# Patient Record
Sex: FEMALE | Employment: OTHER | URBAN - METROPOLITAN AREA
[De-identification: names, ages, dates, MRNs, and addresses within clinical notes are randomized per-mention and may not be internally consistent; named-entity substitution may affect disease eponyms.]

---

## 2019-06-14 ENCOUNTER — TELEPHONE (OUTPATIENT)
Dept: INFECTIOUS DISEASES | Age: 54
End: 2019-06-14

## 2019-06-14 NOTE — TELEPHONE ENCOUNTER
Johana Santo #[phone] from [facility] called asking if Lyla Ma would follow PICC line to home health care, I gave a verbal stating that we would follow for PICC line. .-Christine Hyde    Trying to get a hold of [employer] to give verbal orders. Aysha Alvarez

## 2019-06-17 ENCOUNTER — TELEPHONE (OUTPATIENT)
Dept: INFECTIOUS DISEASES | Age: 54
End: 2019-06-17

## 2019-06-17 NOTE — TELEPHONE ENCOUNTER
Alexis Bailey MD   0\"   6/17/2019 10:09 AM   Mandie Gallegos, [birth year]  Patient has a PICC line and is going on a cruise, patient will not be able to get labs drawn on 7/1, patient wants to know if it would be alright to get her labs drawn on 7/8 when she gets back home. .. Please advise. Kalin Berry  0\"   Read 6/17/2019 10:22 AM   0\"   6/17/2019 10:23 AM   Ok

## 2019-06-19 PROBLEM — L65.9 HAIR LOSS: Status: ACTIVE | Noted: 2019-02-19

## 2019-06-19 PROBLEM — M21.379 FOOT-DROP: Status: ACTIVE | Noted: 2017-11-01

## 2019-06-19 PROBLEM — G93.32 CHRONIC FATIGUE SYNDROME: Status: ACTIVE | Noted: 2017-11-01

## 2019-06-19 PROBLEM — G89.29 CHRONIC PAIN OF LEFT KNEE: Status: ACTIVE | Noted: 2019-02-14

## 2019-06-19 PROBLEM — L03.032 CELLULITIS AND ABSCESS OF TOE OF LEFT FOOT: Status: ACTIVE | Noted: 2018-04-30

## 2019-06-19 PROBLEM — M14.60 CHARCOT'S ARTHROPATHY: Status: ACTIVE | Noted: 2019-02-14

## 2019-06-19 PROBLEM — M79.672 CHRONIC PAIN IN LEFT FOOT: Status: ACTIVE | Noted: 2018-12-06

## 2019-06-19 PROBLEM — E61.1 IRON DEFICIENCY: Status: ACTIVE | Noted: 2019-02-19

## 2019-06-19 PROBLEM — L02.612 CELLULITIS AND ABSCESS OF TOE OF LEFT FOOT: Status: ACTIVE | Noted: 2018-04-30

## 2019-06-19 PROBLEM — M25.562 CHRONIC PAIN OF LEFT KNEE: Status: ACTIVE | Noted: 2019-02-14

## 2019-06-19 PROBLEM — E11.9 TYPE 2 DIABETES MELLITUS WITHOUT COMPLICATION, WITHOUT LONG-TERM CURRENT USE OF INSULIN (HCC): Status: ACTIVE | Noted: 2017-09-12

## 2019-06-19 PROBLEM — M86.9 OSTEOMYELITIS OF LEFT ANKLE (HCC): Status: ACTIVE | Noted: 2019-04-11

## 2019-06-19 PROBLEM — I10 ESSENTIAL HYPERTENSION: Status: ACTIVE | Noted: 2019-02-19

## 2019-06-19 PROBLEM — G25.81 RESTLESS LEGS SYNDROME (RLS): Status: ACTIVE | Noted: 2017-09-12

## 2019-06-19 PROBLEM — Z12.31 ENCOUNTER FOR SCREENING MAMMOGRAM FOR MALIGNANT NEOPLASM OF BREAST: Status: ACTIVE | Noted: 2019-02-19

## 2019-06-19 PROBLEM — Z79.891 ENCOUNTER FOR LONG-TERM OPIATE ANALGESIC USE: Status: ACTIVE | Noted: 2018-12-06

## 2019-06-19 PROBLEM — G89.29 CHRONIC PAIN IN LEFT FOOT: Status: ACTIVE | Noted: 2018-12-06

## 2019-06-19 PROBLEM — Z45.2 PICC (PERIPHERALLY INSERTED CENTRAL CATHETER) IN PLACE: Status: ACTIVE | Noted: 2019-04-12

## 2019-06-19 PROBLEM — R79.89 ABNORMAL CBC: Status: ACTIVE | Noted: 2019-02-19

## 2019-06-19 PROBLEM — K21.9 GASTROESOPHAGEAL REFLUX DISEASE: Status: ACTIVE | Noted: 2019-02-19

## 2019-06-26 ENCOUNTER — TELEPHONE (OUTPATIENT)
Dept: INFECTIOUS DISEASES | Age: 54
End: 2019-06-26

## 2019-06-26 DIAGNOSIS — R79.89 ABNORMAL CBC: Primary | ICD-10-CM

## 2019-06-26 NOTE — TELEPHONE ENCOUNTER
I received a [place] call [place] [facility] need this order faxed.  I faxed it to #[phone]

## 2019-06-26 NOTE — TELEPHONE ENCOUNTER
Please call you patient Madeline Goel [phone] at   [phone]. Her labs from yesterday came back abnormal and she is concerned due to leaving on a lauri tomorrow. Thanks, Laureen  Read 6/26/2019 2:28 PM   6/26/2019 2:35 PM   I ordered rrpeat wbc    6/26/2019 2:44 PM   Thanks!   Unread

## 2019-06-26 NOTE — TELEPHONE ENCOUNTER
I spoke to pt order repeat wbc b/c of high wbc.  I need a call today with result  I told pt if she doesn't feel good then cancel cruise trip

## 2019-06-27 ENCOUNTER — TELEPHONE (OUTPATIENT)
Dept: INFECTIOUS DISEASES | Age: 54
End: 2019-06-27

## 2019-06-27 NOTE — TELEPHONE ENCOUNTER
Please call Rosa Maria Escamilla : [phone]  Reg: WBC had done today. [id number] was suppose call you with results? ? She is getting on a plane you can leave a message. Thanks, Laureen  Read 2019 1:17 PM     2019 1:18 PM   I dont know result. Call me with wbc results    2019 1:27 PM   I talked to het    2019 1:28 PM   I advised to cancel cruise as she had a fever    2019 2:28 PM   Thank you the results are not in her chart yet.   Unread

## 2019-06-28 ENCOUNTER — TELEPHONE (OUTPATIENT)
Dept: INFECTIOUS DISEASES | Age: 54
End: 2019-06-28

## 2019-06-28 DIAGNOSIS — R79.89 ABNORMAL CBC: Primary | ICD-10-CM

## 2019-06-28 NOTE — TELEPHONE ENCOUNTER
Tania Foster gave me a message that patient called back. I called patient back and left a message on machine asking for a call back. Our phone number was provided.

## 2019-06-28 NOTE — TELEPHONE ENCOUNTER
Patient called, I faxed her blood culture orders to [facility] in [place] #[phone]

## 2019-07-10 DIAGNOSIS — R79.89 ABNORMAL CBC: ICD-10-CM

## 2019-07-11 ENCOUNTER — TELEPHONE (OUTPATIENT)
Dept: INFECTIOUS DISEASES | Age: 54
End: 2019-07-11

## 2019-07-11 NOTE — TELEPHONE ENCOUNTER
[facility] called asking when the patients next appointment is. .-Karen Dee

## 2019-07-18 ENCOUNTER — TELEPHONE (OUTPATIENT)
Dept: INFECTIOUS DISEASES | Age: 54
End: 2019-07-18

## 2019-07-18 NOTE — TELEPHONE ENCOUNTER
Alan Chau MD 7/18/2019 11:48 AM Good Morning Dr. Radha Boogie from [place] is calling stating that your patient Nile Kendall Female, 52 yrs, 1965 IV meds are completed today and patient has a follow up with you on 8/1/19. do you want this patient Picc line to be removed or maintained until seen. If you would like it pulled I will need an order placed because the patient needs to go to an infusion center to have it pulled. Please advise Violetta Essex Read 7/18/2019 11:49 AM 7/18/2019 11:53 AM Maintain line 7/18/2019 11:56 AM thank you! Read 7/18/2019 11:57 AM       CALLED PT AND INFORMED HER TO MAINTAIN LINE UNTIL SEEN.  [phone].

## 2019-07-19 PROBLEM — Z12.31 ENCOUNTER FOR SCREENING MAMMOGRAM FOR MALIGNANT NEOPLASM OF BREAST: Status: RESOLVED | Noted: 2019-02-19 | Resolved: 2019-07-19

## 2019-08-02 ENCOUNTER — TELEPHONE (OUTPATIENT)
Dept: INFECTIOUS DISEASES | Age: 54
End: 2019-08-02

## 2019-08-07 LAB
A/G RATIO: NORMAL
ALBUMIN SERPL-MCNC: NORMAL G/DL
ALP BLD-CCNC: NORMAL U/L
ALT SERPL-CCNC: NORMAL U/L
AST SERPL-CCNC: NORMAL U/L
BILIRUB SERPL-MCNC: NORMAL MG/DL (ref 0.1–1.4)
BILIRUBIN DIRECT: NORMAL MG/DL
BILIRUBIN, INDIRECT: NORMAL
GLOBULIN: NORMAL
PROTEIN TOTAL: NORMAL G/DL

## 2019-08-07 NOTE — TELEPHONE ENCOUNTER
Dr. Chaz Aguirre placed the orders I spoke with the patient she is going to [facility] and she asked the orders to be faxed to [phone]. This has been taken care of.

## 2019-08-08 ENCOUNTER — HOSPITAL ENCOUNTER (OUTPATIENT)
Age: 54
Setting detail: SPECIMEN
Discharge: HOME | End: 2019-08-08
Payer: COMMERCIAL

## 2019-08-08 ENCOUNTER — OFFICE VISIT (OUTPATIENT)
Dept: INFECTIOUS DISEASES | Age: 54
End: 2019-08-08
Payer: COMMERCIAL

## 2019-08-08 VITALS
SYSTOLIC BLOOD PRESSURE: 117 MMHG | TEMPERATURE: 98.4 F | DIASTOLIC BLOOD PRESSURE: 67 MMHG | HEART RATE: 60 BPM | WEIGHT: 293 LBS | HEIGHT: 72 IN | BODY MASS INDEX: 39.68 KG/M2 | OXYGEN SATURATION: 100 %

## 2019-08-08 DIAGNOSIS — M86.9 OSTEOMYELITIS, UNSPECIFIED SITE, UNSPECIFIED TYPE (HCC): ICD-10-CM

## 2019-08-08 DIAGNOSIS — M86.9 OSTEOMYELITIS, UNSPECIFIED SITE, UNSPECIFIED TYPE (HCC): Primary | ICD-10-CM

## 2019-08-08 PROCEDURE — 99213 OFFICE O/P EST LOW 20 MIN: CPT | Performed by: INTERNAL MEDICINE

## 2019-08-08 RX ORDER — MULTIVITAMIN WITH IRON
100 TABLET ORAL DAILY
COMMUNITY

## 2019-08-08 RX ORDER — DOXYCYCLINE HYCLATE 50 MG/1
100 CAPSULE ORAL
COMMUNITY
Start: 2019-08-01 | End: 2019-10-02 | Stop reason: ALTCHOICE

## 2019-08-08 RX ORDER — DULOXETIN HYDROCHLORIDE 30 MG/1
30 CAPSULE, DELAYED RELEASE ORAL DAILY
COMMUNITY
Start: 2019-06-24

## 2019-08-08 RX ORDER — ROPINIROLE 1 MG/1
2 TABLET, FILM COATED ORAL NIGHTLY
COMMUNITY
Start: 2019-02-19

## 2019-08-08 RX ORDER — GABAPENTIN 800 MG/1
800 TABLET ORAL 3 TIMES DAILY
COMMUNITY

## 2019-08-08 RX ORDER — FERROUS SULFATE 325(65) MG
325 TABLET ORAL 2 TIMES DAILY
COMMUNITY

## 2019-08-08 RX ORDER — METOPROLOL TARTRATE 50 MG/1
75 TABLET, FILM COATED ORAL 2 TIMES DAILY
COMMUNITY
Start: 2019-04-09

## 2019-08-08 RX ORDER — HYDROCHLOROTHIAZIDE 12.5 MG/1
12.5 CAPSULE, GELATIN COATED ORAL DAILY
COMMUNITY
Start: 2019-05-20

## 2019-08-08 RX ORDER — AMLODIPINE BESYLATE 5 MG/1
5 TABLET ORAL NIGHTLY
COMMUNITY
Start: 2019-04-17

## 2019-08-08 RX ORDER — LEVOTHYROXINE SODIUM 0.1 MG/1
100 TABLET ORAL DAILY
COMMUNITY
Start: 2019-06-13

## 2019-08-08 RX ORDER — ACETAMINOPHEN 500 MG
500 TABLET ORAL EVERY 6 HOURS PRN
COMMUNITY

## 2019-08-08 RX ORDER — LOSARTAN POTASSIUM 100 MG/1
100 TABLET ORAL DAILY
COMMUNITY
Start: 2019-07-08

## 2019-08-08 RX ORDER — TRAMADOL HYDROCHLORIDE 50 MG/1
100 TABLET ORAL
COMMUNITY
End: 2019-10-02 | Stop reason: ALTCHOICE

## 2019-08-08 RX ORDER — OMEPRAZOLE 40 MG/1
40 CAPSULE, DELAYED RELEASE ORAL DAILY
COMMUNITY
Start: 2019-03-07

## 2019-08-08 RX ORDER — CYANOCOBALAMIN (VITAMIN B-12) 500 MCG
800 LOZENGE ORAL EVERY MORNING
COMMUNITY

## 2019-08-09 DIAGNOSIS — M86.9 OSTEOMYELITIS, UNSPECIFIED SITE, UNSPECIFIED TYPE (HCC): ICD-10-CM

## 2019-08-09 NOTE — PROGRESS NOTES
by mouth, Disp: , Rfl:     DULoxetine (CYMBALTA) 30 MG extended release capsule, Take 30 mg by mouth, Disp: , Rfl:     gabapentin (NEURONTIN) 800 MG tablet, Take 800 mg by mouth. Take 600 mg qd, Disp: , Rfl:     Heparin Sodium, Porcine, (HEPARIN LOCK FLUSH IJ), Infuse 5 mLs intravenously, Disp: , Rfl:     hydrochlorothiazide (MICROZIDE) 12.5 MG capsule, TAKE ONE CAPSULE BY MOUTH DAILY, Disp: , Rfl:     ferrous sulfate 325 (65 Fe) MG tablet, Take 325 mg by mouth, Disp: , Rfl:     levothyroxine (SYNTHROID) 100 MCG tablet, TAKE ONE TABLET BY MOUTH DAILY, Disp: , Rfl:     losartan (COZAAR) 100 MG tablet, Take 100 mg by mouth Qd, Disp: , Rfl:     metFORMIN (GLUCOPHAGE) 500 MG tablet, TAKE ONE TABLET BY MOUTH DAILY WITH BREAKFAST, Disp: , Rfl:     metoprolol tartrate (LOPRESSOR) 50 MG tablet, Take 75 mg by mouth BID, Disp: , Rfl:     omeprazole (PRILOSEC) 40 MG delayed release capsule, Take 40 mg by mouth Qd, Disp: , Rfl:     rOPINIRole (REQUIP) 1 MG tablet, Take one or two at bedtime, Disp: , Rfl:     Vitamin E 400 units TABS, 800 units in the AM, Disp: , Rfl:     traMADol (ULTRAM) 50 MG tablet, Take 100 mg by mouth. TID, Disp: , Rfl:     acetaminophen (TYLENOL) 500 MG tablet, Take 500 mg by mouth, Disp: , Rfl:     vitamin D 1000 units CAPS, Take 1,000 Units by mouth, Disp: , Rfl:     vitamin B-6 (PYRIDOXINE) 100 MG tablet, Vitamin B-6 100 MG Oral Tablet TAKE 1 TABLET DAILY AS DIRECTED.   Refills: 0 Active, Disp: , Rfl:      Review of Systems:  CONSTITUTIONAL:  negative  EYES:  negative  HEENT:  negative  RESPIRATORY:  negative  CARDIOVASCULAR:  negative  GASTROINTESTINAL:  negative  GENITOURINARY:  negative  INTEGUMENT/BREAST:  negative  HEMATOLOGIC/LYMPHATIC:  negative  ALLERGIC/IMMUNOLOGIC:  negative  ENDOCRINE:  negative  MUSCULOSKELETAL:  negative  NEUROLOGICAL:  negative  BEHAVIOR/PSYCH:  negative    /67 (Site: Right Upper Arm, Position: Sitting, Cuff Size: Large Adult)   Pulse 60   Temp 98.4

## 2019-08-11 LAB
CULTURE: ABNORMAL
DIRECT EXAM: ABNORMAL
DIRECT EXAM: ABNORMAL
Lab: ABNORMAL
SPECIMEN DESCRIPTION: ABNORMAL

## 2019-08-12 ENCOUNTER — TELEPHONE (OUTPATIENT)
Dept: INFECTIOUS DISEASES | Age: 54
End: 2019-08-12

## 2019-08-13 DIAGNOSIS — M86.9 OSTEOMYELITIS, UNSPECIFIED SITE, UNSPECIFIED TYPE (HCC): ICD-10-CM

## 2019-08-14 RX ORDER — AMOXICILLIN AND CLAVULANATE POTASSIUM 500; 125 MG/1; MG/1
1 TABLET, FILM COATED ORAL 3 TIMES DAILY
Qty: 72 TABLET | Refills: 0 | OUTPATIENT
Start: 2019-08-14 | End: 2019-09-07

## 2019-09-04 ENCOUNTER — HOSPITAL ENCOUNTER (OUTPATIENT)
Age: 54
Setting detail: SPECIMEN
Discharge: HOME | End: 2019-09-04
Payer: COMMERCIAL

## 2019-09-04 ENCOUNTER — OFFICE VISIT (OUTPATIENT)
Dept: INFECTIOUS DISEASES | Age: 54
End: 2019-09-04
Payer: COMMERCIAL

## 2019-09-04 VITALS
DIASTOLIC BLOOD PRESSURE: 72 MMHG | WEIGHT: 293 LBS | OXYGEN SATURATION: 98 % | HEIGHT: 72 IN | HEART RATE: 62 BPM | BODY MASS INDEX: 39.68 KG/M2 | TEMPERATURE: 97.5 F | SYSTOLIC BLOOD PRESSURE: 153 MMHG

## 2019-09-04 DIAGNOSIS — M86.9 OSTEOMYELITIS, UNSPECIFIED SITE, UNSPECIFIED TYPE (HCC): ICD-10-CM

## 2019-09-04 DIAGNOSIS — M86.9 OSTEOMYELITIS, UNSPECIFIED SITE, UNSPECIFIED TYPE (HCC): Primary | ICD-10-CM

## 2019-09-04 LAB
ABSOLUTE EOS #: 0.35 K/UL (ref 0–0.44)
ABSOLUTE IMMATURE GRANULOCYTE: 0.03 K/UL (ref 0–0.3)
ABSOLUTE LYMPH #: 2.51 K/UL (ref 1.1–3.7)
ABSOLUTE MONO #: 0.72 K/UL (ref 0.1–1.2)
ALBUMIN SERPL-MCNC: 4.3 G/DL (ref 3.5–5.2)
ALBUMIN/GLOBULIN RATIO: 1.1 (ref 1–2.5)
ALP BLD-CCNC: 170 U/L (ref 35–104)
ALT SERPL-CCNC: 20 U/L (ref 5–33)
AST SERPL-CCNC: 22 U/L
BASOPHILS # BLD: 1 % (ref 0–2)
BASOPHILS ABSOLUTE: 0.05 K/UL (ref 0–0.2)
BILIRUB SERPL-MCNC: 0.26 MG/DL (ref 0.3–1.2)
BILIRUBIN DIRECT: <0.08 MG/DL
BILIRUBIN, INDIRECT: ABNORMAL MG/DL (ref 0–1)
CREAT SERPL-MCNC: 0.7 MG/DL (ref 0.5–0.9)
DIFFERENTIAL TYPE: ABNORMAL
EOSINOPHILS RELATIVE PERCENT: 5 % (ref 1–4)
GFR AFRICAN AMERICAN: >60 ML/MIN
GFR NON-AFRICAN AMERICAN: >60 ML/MIN
GFR SERPL CREATININE-BSD FRML MDRD: NORMAL ML/MIN/{1.73_M2}
GFR SERPL CREATININE-BSD FRML MDRD: NORMAL ML/MIN/{1.73_M2}
GLOBULIN: ABNORMAL G/DL (ref 1.5–3.8)
IMMATURE GRANULOCYTES: 0 %
LYMPHOCYTES # BLD: 33 % (ref 24–43)
MONOCYTES # BLD: 9 % (ref 3–12)
PLATELET # BLD: 251 K/UL (ref 138–453)
PLATELET ESTIMATE: ABNORMAL
RBC # BLD: ABNORMAL 10*6/UL
SEG NEUTROPHILS: 53 % (ref 36–65)
SEGMENTED NEUTROPHILS ABSOLUTE COUNT: 4.06 K/UL (ref 1.5–8.1)
TOTAL PROTEIN: 8.3 G/DL (ref 6.4–8.3)
WBC # BLD: 7.7 K/UL (ref 3.5–11.3)
WBC # BLD: ABNORMAL 10*3/UL

## 2019-09-04 PROCEDURE — 99213 OFFICE O/P EST LOW 20 MIN: CPT | Performed by: INTERNAL MEDICINE

## 2019-09-04 RX ORDER — GABAPENTIN 600 MG/1
1 TABLET ORAL DAILY
COMMUNITY
End: 2020-07-01 | Stop reason: ALTCHOICE

## 2019-09-04 RX ORDER — HONEY 100 %
2 PASTE (ML) TOPICAL DAILY
COMMUNITY
Start: 2019-08-22

## 2019-09-04 RX ORDER — AMOXICILLIN AND CLAVULANATE POTASSIUM 500; 125 MG/1; MG/1
1 TABLET, FILM COATED ORAL 2 TIMES DAILY
Qty: 60 TABLET | Refills: 1 | Status: SHIPPED | OUTPATIENT
Start: 2019-09-04 | End: 2019-10-04

## 2019-09-04 NOTE — PROGRESS NOTES
LOCK FLUSH IJ), Infuse 5 mLs intravenously, Disp: , Rfl:     traMADol (ULTRAM) 50 MG tablet, Take 100 mg by mouth. TID, Disp: , Rfl:      Review of Systems:  CONSTITUTIONAL:  negative  EYES:  negative  HEENT:  negative  RESPIRATORY:  negative  CARDIOVASCULAR:  negative  GASTROINTESTINAL:  negative  GENITOURINARY:  negative  INTEGUMENT/BREAST:  negative  HEMATOLOGIC/LYMPHATIC:  negative  ALLERGIC/IMMUNOLOGIC:  negative  ENDOCRINE:  negative  MUSCULOSKELETAL:  negative  NEUROLOGICAL:  negative  BEHAVIOR/PSYCH:  negative    BP (!) 153/72 (Site: Right Lower Arm, Position: Sitting, Cuff Size: Medium Adult) Comment: pt having pain rough last 24 hrs  Pulse 62   Temp 97.5 °F (36.4 °C)   Ht 6' 2\" (1.88 m)   Wt (!) 318 lb 6.4 oz (144.4 kg)   SpO2 98%   BMI 40.88 kg/m²       EXAM:  CONSTITUTIONAL:  awake, alert, cooperative, no apparent distress,  EYES: conjunctiva normal  ENT:  Normocephalic, without obvious abnormality, atraumatic,  LUNGS:  No increased work of breathing, good air exchange, clear to auscultation bilaterally, no crackles or wheezing  CARDIOVASCULAR:  regular rate and rhythm, normal S1 and S2,  no murmur noted  ABDOMEN:   normal bowel sounds, soft, non-distended, non-tender, no masses palpated, no hepatosplenomegally,   MUSCULOSKELETAL: Left lower extremity fixator  NEUROLOGIC:  Awake, alert, oriented to name, place and time  SKIN:  No rash      Data Review:    Reviewed    IMAGING:          Carrie Hurley MD  9/4/2019  11:43 AM      This note was completed using a voice transcription system. Every effort was made to ensure accuracy. However, inadvertent computerized transcription errors may be present.

## 2019-10-02 ENCOUNTER — OFFICE VISIT (OUTPATIENT)
Dept: INFECTIOUS DISEASES | Age: 54
End: 2019-10-02
Payer: COMMERCIAL

## 2019-10-02 VITALS
TEMPERATURE: 97.1 F | HEART RATE: 62 BPM | HEIGHT: 72 IN | DIASTOLIC BLOOD PRESSURE: 74 MMHG | WEIGHT: 293 LBS | SYSTOLIC BLOOD PRESSURE: 136 MMHG | OXYGEN SATURATION: 98 % | BODY MASS INDEX: 39.68 KG/M2

## 2019-10-02 DIAGNOSIS — M86.9 OSTEOMYELITIS, UNSPECIFIED SITE, UNSPECIFIED TYPE (HCC): Primary | ICD-10-CM

## 2019-10-02 PROCEDURE — 99213 OFFICE O/P EST LOW 20 MIN: CPT | Performed by: INTERNAL MEDICINE

## 2019-10-02 RX ORDER — OXYCODONE HYDROCHLORIDE AND ACETAMINOPHEN 5; 325 MG/1; MG/1
1 TABLET ORAL EVERY 6 HOURS PRN
COMMUNITY
Start: 2019-09-05

## 2019-10-02 RX ORDER — ERGOCALCIFEROL (VITAMIN D2) 1250 MCG
50000 CAPSULE ORAL WEEKLY
COMMUNITY
Start: 2019-09-19 | End: 2020-03-04 | Stop reason: ALTCHOICE

## 2019-10-11 LAB
A/G RATIO: NORMAL
ALBUMIN SERPL-MCNC: 4 G/DL
ALP BLD-CCNC: 113 U/L
ALT SERPL-CCNC: 17 U/L
AST SERPL-CCNC: 18 U/L
BILIRUB SERPL-MCNC: 0.3 MG/DL (ref 0.1–1.4)
BILIRUBIN DIRECT: 0 MG/DL
BILIRUBIN, INDIRECT: NORMAL
CREAT SERPL-MCNC: 0.73 MG/DL
GLOBULIN: NORMAL
PROTEIN TOTAL: 7.4 G/DL

## 2019-10-14 DIAGNOSIS — M86.9 OSTEOMYELITIS, UNSPECIFIED SITE, UNSPECIFIED TYPE (HCC): ICD-10-CM

## 2019-10-30 ENCOUNTER — OFFICE VISIT (OUTPATIENT)
Dept: INFECTIOUS DISEASES | Age: 54
End: 2019-10-30
Payer: COMMERCIAL

## 2019-10-30 VITALS
WEIGHT: 293 LBS | BODY MASS INDEX: 39.68 KG/M2 | HEIGHT: 72 IN | SYSTOLIC BLOOD PRESSURE: 134 MMHG | TEMPERATURE: 97.1 F | HEART RATE: 70 BPM | DIASTOLIC BLOOD PRESSURE: 74 MMHG | OXYGEN SATURATION: 98 %

## 2019-10-30 DIAGNOSIS — M86.9 OSTEOMYELITIS, UNSPECIFIED SITE, UNSPECIFIED TYPE (HCC): Primary | ICD-10-CM

## 2019-10-30 PROCEDURE — 99213 OFFICE O/P EST LOW 20 MIN: CPT | Performed by: INTERNAL MEDICINE

## 2019-10-30 RX ORDER — AMOXICILLIN AND CLAVULANATE POTASSIUM 500; 125 MG/1; MG/1
1 TABLET, FILM COATED ORAL 2 TIMES DAILY
Qty: 60 TABLET | Refills: 0 | Status: SHIPPED | OUTPATIENT
Start: 2019-11-15 | End: 2019-12-15

## 2019-10-30 RX ORDER — TIZANIDINE 4 MG/1
4 TABLET ORAL NIGHTLY
COMMUNITY
Start: 2019-10-09

## 2019-11-15 ENCOUNTER — TELEPHONE (OUTPATIENT)
Dept: INFECTIOUS DISEASES | Age: 54
End: 2019-11-15

## 2019-11-15 LAB
A/G RATIO: NORMAL
ALBUMIN SERPL-MCNC: NORMAL G/DL
ALP BLD-CCNC: NORMAL U/L
ALT SERPL-CCNC: NORMAL U/L
AST SERPL-CCNC: NORMAL U/L
BILIRUB SERPL-MCNC: NORMAL MG/DL
BILIRUBIN DIRECT: NORMAL
BILIRUBIN, INDIRECT: NORMAL
CREAT SERPL-MCNC: NORMAL MG/DL
GLOBULIN: NORMAL
PROTEIN TOTAL: NORMAL

## 2019-11-20 DIAGNOSIS — M86.9 OSTEOMYELITIS, UNSPECIFIED SITE, UNSPECIFIED TYPE (HCC): ICD-10-CM

## 2019-12-11 ENCOUNTER — HOSPITAL ENCOUNTER (OUTPATIENT)
Age: 54
Setting detail: SPECIMEN
Discharge: HOME | End: 2019-12-11
Payer: COMMERCIAL

## 2019-12-11 ENCOUNTER — OFFICE VISIT (OUTPATIENT)
Dept: INFECTIOUS DISEASES | Age: 54
End: 2019-12-11
Payer: COMMERCIAL

## 2019-12-11 VITALS
HEART RATE: 52 BPM | HEIGHT: 72 IN | RESPIRATION RATE: 18 BRPM | WEIGHT: 293 LBS | SYSTOLIC BLOOD PRESSURE: 134 MMHG | BODY MASS INDEX: 39.68 KG/M2 | DIASTOLIC BLOOD PRESSURE: 79 MMHG

## 2019-12-11 DIAGNOSIS — M86.9 OSTEOMYELITIS, UNSPECIFIED SITE, UNSPECIFIED TYPE (HCC): Primary | ICD-10-CM

## 2019-12-11 DIAGNOSIS — M86.9 OSTEOMYELITIS, UNSPECIFIED SITE, UNSPECIFIED TYPE (HCC): ICD-10-CM

## 2019-12-11 LAB
ABSOLUTE EOS #: 0.28 K/UL (ref 0–0.44)
ABSOLUTE IMMATURE GRANULOCYTE: <0.03 K/UL (ref 0–0.3)
ABSOLUTE LYMPH #: 2.11 K/UL (ref 1.1–3.7)
ABSOLUTE MONO #: 0.59 K/UL (ref 0.1–1.2)
ALBUMIN SERPL-MCNC: 4 G/DL (ref 3.5–5.2)
ALBUMIN/GLOBULIN RATIO: 0.9 (ref 1–2.5)
ALP BLD-CCNC: 130 U/L (ref 35–104)
ALT SERPL-CCNC: 23 U/L (ref 5–33)
AST SERPL-CCNC: 21 U/L
BASOPHILS # BLD: 1 % (ref 0–2)
BASOPHILS ABSOLUTE: 0.05 K/UL (ref 0–0.2)
BILIRUB SERPL-MCNC: 0.25 MG/DL (ref 0.3–1.2)
BILIRUBIN DIRECT: <0.08 MG/DL
BILIRUBIN, INDIRECT: ABNORMAL MG/DL (ref 0–1)
CHOLESTEROL, FASTING: 219 MG/DL
CHOLESTEROL/HDL RATIO: 6.6
CREAT SERPL-MCNC: 0.76 MG/DL (ref 0.5–0.9)
DIFFERENTIAL TYPE: NORMAL
EOSINOPHILS RELATIVE PERCENT: 4 % (ref 1–4)
GFR AFRICAN AMERICAN: >60 ML/MIN
GFR NON-AFRICAN AMERICAN: >60 ML/MIN
GFR SERPL CREATININE-BSD FRML MDRD: NORMAL ML/MIN/{1.73_M2}
GFR SERPL CREATININE-BSD FRML MDRD: NORMAL ML/MIN/{1.73_M2}
GLOBULIN: ABNORMAL G/DL (ref 1.5–3.8)
HDLC SERPL-MCNC: 33 MG/DL
IMMATURE GRANULOCYTES: 0 %
LDL CHOLESTEROL: 156 MG/DL (ref 0–130)
LYMPHOCYTES # BLD: 29 % (ref 24–43)
MONOCYTES # BLD: 8 % (ref 3–12)
PLATELET # BLD: 343 K/UL (ref 138–453)
PLATELET ESTIMATE: NORMAL
RBC # BLD: NORMAL 10*6/UL
SEG NEUTROPHILS: 58 % (ref 36–65)
SEGMENTED NEUTROPHILS ABSOLUTE COUNT: 4.25 K/UL (ref 1.5–8.1)
THYROXINE, FREE: 1.11 NG/DL (ref 0.93–1.7)
TOTAL PROTEIN: 8.3 G/DL (ref 6.4–8.3)
TRIGLYCERIDE, FASTING: 150 MG/DL
TSH SERPL DL<=0.05 MIU/L-ACNC: 2.99 MIU/L (ref 0.3–5)
VLDLC SERPL CALC-MCNC: ABNORMAL MG/DL (ref 1–30)
WBC # BLD: 7.3 K/UL (ref 3.5–11.3)
WBC # BLD: NORMAL 10*3/UL

## 2019-12-11 PROCEDURE — 99213 OFFICE O/P EST LOW 20 MIN: CPT | Performed by: INTERNAL MEDICINE

## 2019-12-11 RX ORDER — AMOXICILLIN AND CLAVULANATE POTASSIUM 500; 125 MG/1; MG/1
1 TABLET, FILM COATED ORAL 2 TIMES DAILY
Qty: 60 TABLET | Refills: 1 | Status: SHIPPED | OUTPATIENT
Start: 2019-12-11 | End: 2020-01-10

## 2019-12-15 ENCOUNTER — TELEPHONE (OUTPATIENT)
Dept: INFECTIOUS DISEASES | Age: 54
End: 2019-12-15

## 2019-12-15 DIAGNOSIS — M86.9 OSTEOMYELITIS, UNSPECIFIED SITE, UNSPECIFIED TYPE (HCC): Primary | ICD-10-CM

## 2019-12-18 ENCOUNTER — HOSPITAL ENCOUNTER (OUTPATIENT)
Age: 54
Setting detail: SPECIMEN
Discharge: HOME | End: 2019-12-18
Payer: COMMERCIAL

## 2019-12-18 DIAGNOSIS — M86.9 OSTEOMYELITIS, UNSPECIFIED SITE, UNSPECIFIED TYPE (HCC): ICD-10-CM

## 2019-12-18 LAB
ALBUMIN SERPL-MCNC: 4.1 G/DL (ref 3.5–5.2)
ALBUMIN/GLOBULIN RATIO: 1 (ref 1–2.5)
ALP BLD-CCNC: 137 U/L (ref 35–104)
ALT SERPL-CCNC: 23 U/L (ref 5–33)
AST SERPL-CCNC: 20 U/L
BILIRUB SERPL-MCNC: 0.23 MG/DL (ref 0.3–1.2)
BILIRUBIN DIRECT: <0.08 MG/DL
BILIRUBIN, INDIRECT: ABNORMAL MG/DL (ref 0–1)
GLOBULIN: ABNORMAL G/DL (ref 1.5–3.8)
TOTAL PROTEIN: 8.4 G/DL (ref 6.4–8.3)

## 2020-01-13 ENCOUNTER — TELEPHONE (OUTPATIENT)
Dept: INFECTIOUS DISEASES | Age: 55
End: 2020-01-13

## 2020-01-13 NOTE — TELEPHONE ENCOUNTER
Jeri Chaudhary MD 1/13/2020 9:49 AM Ashley Robbins Female, 62 yrs, 1965, ,   MRN:   [id number]  called and stated that she have a discharge with bloody snot color, it is red in inflamed on her foot, she is having a lot of pain with a slight odor.  she stated that she had a low grade fever this weekend please advise Sol Resendez 1/13/2020 10:04 AM 1/13/2020 10:04 AM Send to ER     patient informed

## 2020-02-12 ENCOUNTER — HOSPITAL ENCOUNTER (OUTPATIENT)
Age: 55
Setting detail: SPECIMEN
Discharge: HOME | End: 2020-02-12
Payer: COMMERCIAL

## 2020-02-12 ENCOUNTER — OFFICE VISIT (OUTPATIENT)
Dept: INFECTIOUS DISEASES | Age: 55
End: 2020-02-12
Payer: COMMERCIAL

## 2020-02-12 VITALS
SYSTOLIC BLOOD PRESSURE: 153 MMHG | RESPIRATION RATE: 14 BRPM | TEMPERATURE: 97.3 F | BODY MASS INDEX: 39.68 KG/M2 | HEART RATE: 69 BPM | WEIGHT: 293 LBS | DIASTOLIC BLOOD PRESSURE: 92 MMHG | HEIGHT: 72 IN

## 2020-02-12 LAB
ABSOLUTE EOS #: 0.36 K/UL (ref 0–0.44)
ABSOLUTE IMMATURE GRANULOCYTE: 0.04 K/UL (ref 0–0.3)
ABSOLUTE LYMPH #: 2.41 K/UL (ref 1.1–3.7)
ABSOLUTE MONO #: 0.83 K/UL (ref 0.1–1.2)
ALBUMIN SERPL-MCNC: 4.3 G/DL (ref 3.5–5.2)
ALBUMIN/GLOBULIN RATIO: 1 (ref 1–2.5)
ALP BLD-CCNC: 116 U/L (ref 35–104)
ALT SERPL-CCNC: 26 U/L (ref 5–33)
AST SERPL-CCNC: 25 U/L
BASOPHILS # BLD: 1 % (ref 0–2)
BASOPHILS ABSOLUTE: 0.05 K/UL (ref 0–0.2)
BILIRUB SERPL-MCNC: 0.28 MG/DL (ref 0.3–1.2)
BILIRUBIN DIRECT: <0.08 MG/DL
BILIRUBIN, INDIRECT: ABNORMAL MG/DL (ref 0–1)
CREAT SERPL-MCNC: 0.72 MG/DL (ref 0.5–0.9)
DIFFERENTIAL TYPE: ABNORMAL
EOSINOPHILS RELATIVE PERCENT: 4 % (ref 1–4)
GFR AFRICAN AMERICAN: >60 ML/MIN
GFR NON-AFRICAN AMERICAN: >60 ML/MIN
GFR SERPL CREATININE-BSD FRML MDRD: NORMAL ML/MIN/{1.73_M2}
GFR SERPL CREATININE-BSD FRML MDRD: NORMAL ML/MIN/{1.73_M2}
GLOBULIN: ABNORMAL G/DL (ref 1.5–3.8)
IMMATURE GRANULOCYTES: 1 %
LYMPHOCYTES # BLD: 29 % (ref 24–43)
MONOCYTES # BLD: 10 % (ref 3–12)
PLATELET # BLD: 262 K/UL (ref 138–453)
PLATELET ESTIMATE: ABNORMAL
RBC # BLD: ABNORMAL 10*6/UL
SEG NEUTROPHILS: 55 % (ref 36–65)
SEGMENTED NEUTROPHILS ABSOLUTE COUNT: 4.55 K/UL (ref 1.5–8.1)
TOTAL PROTEIN: 8.5 G/DL (ref 6.4–8.3)
WBC # BLD: 8.2 K/UL (ref 3.5–11.3)
WBC # BLD: ABNORMAL 10*3/UL

## 2020-02-12 PROCEDURE — 99213 OFFICE O/P EST LOW 20 MIN: CPT | Performed by: INTERNAL MEDICINE

## 2020-02-12 RX ORDER — IBUPROFEN 200 MG
600 TABLET ORAL DAILY
COMMUNITY

## 2020-02-12 RX ORDER — EZETIMIBE 10 MG/1
10 TABLET ORAL DAILY
COMMUNITY
Start: 2019-12-12

## 2020-02-12 RX ORDER — DOXYCYCLINE HYCLATE 100 MG
100 TABLET ORAL 2 TIMES DAILY
Qty: 14 TABLET | Refills: 0 | Status: SHIPPED | OUTPATIENT
Start: 2020-02-12 | End: 2020-02-26

## 2020-02-12 RX ORDER — AMOXICILLIN AND CLAVULANATE POTASSIUM 500; 125 MG/1; MG/1
1 TABLET, FILM COATED ORAL 2 TIMES DAILY
COMMUNITY
End: 2020-07-01 | Stop reason: ALTCHOICE

## 2020-02-12 RX ORDER — AMOXICILLIN AND CLAVULANATE POTASSIUM 500; 125 MG/1; MG/1
1 TABLET, FILM COATED ORAL 2 TIMES DAILY
Qty: 60 TABLET | Refills: 2 | Status: SHIPPED
Start: 2020-02-12 | End: 2020-03-04 | Stop reason: SDUPTHER

## 2020-02-12 NOTE — PROGRESS NOTES
[facility]    Follow-up NOTE           Visit date: 2/12/2020      Reason for visit /chief complaints   Pin site infection    Assessment:     Encounter Diagnosis   Name Primary? 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 and osteomyelitis/cellulitis left lower extremity, was planned to be treated till June 10th 2019 based upon the culture data  Previous culture MSSA and Enterobacter along with anaerobic gram-positive cocci January 2019  Status post partial excision of calcaneus/tibia for deep osteomyelitis, left tibia deep implant removal with insertion of antibiotic nail to the left leg 04/30/2019 cultures negative for aerobic organism positive for anaerobic gram-positive cocci  Bilateral Charcot foot  History of left 2nd toe osteomyelitis status post amputation  Diabetes mellitus  Hypertension  Obstructive sleep apnea  Fibromyalgia      Plan:     Recent wound culture was positive for Corynebacterium from the drainage from the left lower extremity pain site   start patient doxycycline for 2 weeks   Will continue Augmentin no refill ordered   if persistent issues need to start IV antibiotic   Supportive care   cultures ordered    check WBC platelets creatinine LFTs next    HPI:     patient is a 49-year-old female came in for follow-up. I am following her for left lower extremity infected left tibial/calcaneal/talo fusion with retained intramedullary nail. She has a external fixator in place.   Recently she was having some issues with the left lower extremity drainage and fever and was advised to go to the ER but she  She can go to the hospital she was seen by her doctor and was put on doxycycline for which she took it for 7 days of fever resolved. She is feeling better but she still having some issues with left lower extremity external fixator pin site. She is also following  Wound care for the right foot wound. No fever or chills now . No cough shortness of breath. No vomiting or diarrhea. Past Medical History:   Diagnosis Date    Abnormal CBC 2/19/2019    Cellulitis and abscess of toe of left foot 4/30/2018    Charcot's arthropathy 2/14/2019    Chronic pain in left foot 12/6/2018    Chronic pain of left knee 2/14/2019    Encounter for screening mammogram for malignant neoplasm of breast 2/19/2019    Foot-drop 11/1/2017    Gastroesophageal reflux disease 2/19/2019    Iron deficiency 2/19/2019    Restless legs syndrome (RLS) 9/12/2017    Type 2 diabetes mellitus without complication, without long-term current use of insulin ([address] 9/12/2017     No past surgical history on file.   Social History     Socioeconomic History    Marital status:      Spouse name: None    Number of children: None    Years of education: None    Highest education level: None   Occupational History    None   Social Needs    Financial resource strain: None    Food insecurity:     Worry: None     Inability: None    Transportation needs:     Medical: None     Non-medical: None   Tobacco Use    Smoking status: Never Smoker    Smokeless tobacco: Never Used   Substance and Sexual Activity    Alcohol use: None    Drug use: None    Sexual activity: None   Lifestyle    Physical activity:     Days per week: None     Minutes per session: None    Stress: None   Relationships    Social connections:     Talks on phone: None     Gets together: None     Attends Religion service: None     Active member of club or organization: None     Attends meetings (GLUCOPHAGE) 500 MG tablet, Take 500 mg by mouth daily (with breakfast) , Disp: , Rfl:     metoprolol tartrate (LOPRESSOR) 50 MG tablet, Take 75 mg by mouth 2 times daily , Disp: , Rfl:     omeprazole (PRILOSEC) 40 MG delayed release capsule, Take 40 mg by mouth daily , Disp: , Rfl:     rOPINIRole (REQUIP) 1 MG tablet, 1-2 tablets at bedtime, Disp: , Rfl:     Vitamin E 400 units TABS, Take 800 Units by mouth every morning , Disp: , Rfl:     acetaminophen (TYLENOL) 500 MG tablet, Take 500 mg by mouth every 6 hours as needed , Disp: , Rfl:     vitamin D 1000 units CAPS, Take 2,000 Units by mouth daily , Disp: , Rfl:     vitamin B-6 (PYRIDOXINE) 100 MG tablet, Take 100 mg by mouth daily , Disp: , Rfl:     sodium chloride 0.9 % SOLN 100 mL with ampicillin-sulbactam 15 (10-5) g SOLR 3 g, Infuse 3 g intravenously 4 times daily, Disp: , Rfl:     ergocalciferol (ERGOCALCIFEROL) 27327 units capsule, Take 50,000 Units by mouth once a week, Disp: , Rfl:     Wound Dressings ([facility] WOUND/BURN DRESSING) PSTE paste, Apply 2 g topically daily, Disp: , Rfl:      Review of Systems:  CONSTITUTIONAL:  negative  EYES:  negative  HEENT:  negative  RESPIRATORY:  negative  CARDIOVASCULAR:  negative  GASTROINTESTINAL:  negative  GENITOURINARY:  negative  INTEGUMENT/BREAST:  negative  HEMATOLOGIC/LYMPHATIC:  negative  ALLERGIC/IMMUNOLOGIC:  negative  ENDOCRINE:  negative  MUSCULOSKELETAL:  negative  NEUROLOGICAL:  negative  BEHAVIOR/PSYCH:  negative    BP (!) 153/92 (Site: Right Lower Arm, Position: Sitting, Cuff Size: Large Adult)   Pulse 69   Temp 97.3 °F (36.3 °C) (Oral)   Resp 14   Ht 6' 2\" (1.88 m)   Wt (!) 329 lb (149.2 kg)   BMI 42.24 kg/m²       EXAM:  CONSTITUTIONAL:  awake, alert, cooperative, no apparent distress,  EYES: conjunctiva normal  ENT:  Normocephalic, without obvious abnormality, atraumatic,  LUNGS:  No increased work of breathing, good air exchange, clear to auscultation bilaterally, no crackles or

## 2020-02-19 LAB
CULTURE: NORMAL
CULTURE: NORMAL
Lab: NORMAL
Lab: NORMAL
SPECIMEN DESCRIPTION: NORMAL
SPECIMEN DESCRIPTION: NORMAL

## 2020-03-04 ENCOUNTER — OFFICE VISIT (OUTPATIENT)
Dept: INFECTIOUS DISEASES | Age: 55
End: 2020-03-04
Payer: COMMERCIAL

## 2020-03-04 VITALS
SYSTOLIC BLOOD PRESSURE: 126 MMHG | DIASTOLIC BLOOD PRESSURE: 61 MMHG | HEIGHT: 72 IN | WEIGHT: 293 LBS | TEMPERATURE: 97.8 F | HEART RATE: 63 BPM | BODY MASS INDEX: 39.68 KG/M2 | OXYGEN SATURATION: 99 %

## 2020-03-04 PROCEDURE — 99213 OFFICE O/P EST LOW 20 MIN: CPT | Performed by: INTERNAL MEDICINE

## 2020-03-04 RX ORDER — DOXYCYCLINE HYCLATE 100 MG
100 TABLET ORAL 2 TIMES DAILY
Qty: 60 TABLET | Refills: 0 | Status: SHIPPED | OUTPATIENT
Start: 2020-03-04 | End: 2020-04-03

## 2020-03-04 NOTE — PROGRESS NOTES
units TABS, Take 800 Units by mouth every morning , Disp: , Rfl:     acetaminophen (TYLENOL) 500 MG tablet, Take 500 mg by mouth every 6 hours as needed , Disp: , Rfl:     vitamin D 1000 units CAPS, Take 2,000 Units by mouth daily , Disp: , Rfl:     vitamin B-6 (PYRIDOXINE) 100 MG tablet, Take 100 mg by mouth daily , Disp: , Rfl:      Review of Systems:  CONSTITUTIONAL:  negative  EYES:  negative  HEENT:  negative  RESPIRATORY:  negative  CARDIOVASCULAR:  negative  GASTROINTESTINAL:  negative  GENITOURINARY:  negative  INTEGUMENT/BREAST:  negative  HEMATOLOGIC/LYMPHATIC:  negative  ALLERGIC/IMMUNOLOGIC:  negative  ENDOCRINE:  negative  MUSCULOSKELETAL:  negative  NEUROLOGICAL:  negative  BEHAVIOR/PSYCH:  negative    /61 (Site: Right Lower Arm, Position: Sitting, Cuff Size: Large Adult)   Pulse 63   Temp 97.8 °F (36.6 °C) (Oral)   Ht 6' 2\" (1.88 m)   Wt (!) 325 lb (147.4 kg)   SpO2 99%   BMI 41.73 kg/m²       EXAM:  CONSTITUTIONAL:  awake, alert, cooperative, no apparent distress,  EYES: conjunctiva normal  ENT:  Normocephalic, without obvious abnormality, atraumatic,  LUNGS:  No increased work of breathing, good air exchange, clear to auscultation bilaterally, no crackles or wheezing  CARDIOVASCULAR:  regular rate and rhythm, normal S1 and S2,  no murmur noted  ABDOMEN:   normal bowel sounds, soft, non-distended, non-tender, no masses palpated, no hepatosplenomegally,   MUSCULOSKELETAL: Lower extremity fixator, drainage from the pin site  NEUROLOGIC:  Awake, alert, oriented to name, place and time  SKIN:  No rash      Data Review:    CBC with Diff  Results in Past 30 Days  Result Component Current Result Ref Range Previous Result Ref Range   Absolute Eos # 0.36 (2/12/2020) 0.00 - 0.44 k/uL Not in Time Range    Absolute Lymph # 2.41 (2/12/2020) 1.10 - 3.70 k/uL Not in Time Range    Absolute Mono # 0.83 (2/12/2020) 0.10 - 1.20 k/uL Not in Time Range    Basophils Absolute 0.05 (2/12/2020) 0.00 - 0.20

## 2020-04-01 ENCOUNTER — OFFICE VISIT (OUTPATIENT)
Dept: INFECTIOUS DISEASES | Age: 55
End: 2020-04-01
Payer: COMMERCIAL

## 2020-04-01 VITALS
SYSTOLIC BLOOD PRESSURE: 139 MMHG | HEART RATE: 60 BPM | WEIGHT: 293 LBS | BODY MASS INDEX: 39.68 KG/M2 | HEIGHT: 72 IN | TEMPERATURE: 97.9 F | DIASTOLIC BLOOD PRESSURE: 72 MMHG

## 2020-04-01 PROCEDURE — 99213 OFFICE O/P EST LOW 20 MIN: CPT | Performed by: INTERNAL MEDICINE

## 2020-04-01 RX ORDER — SULFAMETHOXAZOLE AND TRIMETHOPRIM 800; 160 MG/1; MG/1
1 TABLET ORAL 2 TIMES DAILY
COMMUNITY

## 2020-05-13 PROBLEM — E11.9 TYPE 2 DIABETES MELLITUS WITHOUT COMPLICATION (HCC): Status: ACTIVE | Noted: 2017-09-12

## 2020-05-13 PROBLEM — H52.13 MYOPIA, BILATERAL: Status: ACTIVE | Noted: 2020-03-30

## 2020-05-13 PROBLEM — L03.039 CELLULITIS AND ABSCESS OF TOE: Status: ACTIVE | Noted: 2018-04-30

## 2020-05-13 PROBLEM — I10 HYPERTENSION: Status: ACTIVE | Noted: 2020-05-13

## 2020-05-13 PROBLEM — R79.89 ABNORMAL COMPLETE BLOOD COUNT: Status: ACTIVE | Noted: 2019-02-19

## 2020-05-13 PROBLEM — G25.81 RESTLESS LEGS: Status: ACTIVE | Noted: 2017-09-12

## 2020-05-13 PROBLEM — H25.13 AGE-RELATED NUCLEAR CATARACT, BILATERAL: Status: ACTIVE | Noted: 2020-03-30

## 2020-05-13 PROBLEM — M25.569 KNEE PAIN: Status: ACTIVE | Noted: 2019-02-14

## 2020-05-13 PROBLEM — L65.9 LOSS OF HAIR: Status: ACTIVE | Noted: 2019-02-19

## 2020-05-13 PROBLEM — L02.619 CELLULITIS AND ABSCESS OF TOE: Status: ACTIVE | Noted: 2018-04-30

## 2020-05-13 PROBLEM — Z45.2 PERIPHERALLY INSERTED CENTRAL VENOUS CATHETER IN SITU: Status: ACTIVE | Noted: 2019-04-12

## 2020-05-20 ENCOUNTER — VIRTUAL VISIT (OUTPATIENT)
Dept: INFECTIOUS DISEASES | Age: 55
End: 2020-05-20
Payer: MEDICARE

## 2020-05-20 VITALS — HEIGHT: 72 IN | TEMPERATURE: 98.6 F | BODY MASS INDEX: 39.68 KG/M2 | WEIGHT: 293 LBS

## 2020-05-20 PROCEDURE — G8427 DOCREV CUR MEDS BY ELIG CLIN: HCPCS | Performed by: INTERNAL MEDICINE

## 2020-05-20 PROCEDURE — 99213 OFFICE O/P EST LOW 20 MIN: CPT | Performed by: INTERNAL MEDICINE

## 2020-05-20 PROCEDURE — 3017F COLORECTAL CA SCREEN DOC REV: CPT | Performed by: INTERNAL MEDICINE

## 2020-05-20 RX ORDER — DIAZEPAM 5 MG/1
TABLET ORAL
COMMUNITY
Start: 2020-05-06

## 2020-05-20 NOTE — PROGRESS NOTES
disease 2/19/2019    Hair loss 2/19/2019    Hypertension 5/13/2020    Iron deficiency 2/19/2019    Knee pain 2/14/2019    Loss of hair 2/19/2019    Myopia, bilateral 3/30/2020    Osteomyelitis of left ankle ([facility] 4/11/2019    Peripherally inserted central venous catheter in situ 4/12/2019    PICC (peripherally inserted central catheter) in place 4/12/2019    Restless legs 9/12/2017    Restless legs syndrome (RLS) 9/12/2017    Type 2 diabetes mellitus without complication ([facility] 6/47/8349    Type 2 diabetes mellitus without complication, without long-term current use of insulin ([facility] 9/12/2017     No past surgical history on file.   Social History     Socioeconomic History    Marital status:      Spouse name: Not on file    Number of children: Not on file    Years of education: Not on file    Highest education level: Not on file   Occupational History    Not on file   Social Needs    Financial resource strain: Not on file    Food insecurity     Worry: Not on file     Inability: Not on file    Transportation needs     Medical: Not on file     Non-medical: Not on file   Tobacco Use    Smoking status: Never Smoker    Smokeless tobacco: Never Used   Substance and Sexual Activity    Alcohol use: Not on file    Drug use: Not on file    Sexual activity: Not on file   Lifestyle    Physical activity     Days per week: Not on file     Minutes per session: Not on file    Stress: Not on file   Relationships    Social connections     Talks on phone: Not on file     Gets together: Not on file     Attends Buddhist service: Not on file     Active member of club or organization: Not on file     Attends meetings of clubs or organizations: Not on file     Relationship status: Not on file    Intimate partner violence     Fear of current or ex partner: Not on file     Emotionally abused: Not on file     Physically abused: Not on file     Forced sexual activity: Not on file   Other Topics Concern   

## 2020-07-01 ENCOUNTER — OFFICE VISIT (OUTPATIENT)
Dept: INFECTIOUS DISEASES | Age: 55
End: 2020-07-01
Payer: MEDICARE

## 2020-07-01 VITALS
DIASTOLIC BLOOD PRESSURE: 74 MMHG | HEIGHT: 72 IN | TEMPERATURE: 97.7 F | WEIGHT: 293 LBS | SYSTOLIC BLOOD PRESSURE: 132 MMHG | OXYGEN SATURATION: 98 % | HEART RATE: 59 BPM | BODY MASS INDEX: 39.68 KG/M2

## 2020-07-01 PROCEDURE — 1036F TOBACCO NON-USER: CPT | Performed by: INTERNAL MEDICINE

## 2020-07-01 PROCEDURE — G8417 CALC BMI ABV UP PARAM F/U: HCPCS | Performed by: INTERNAL MEDICINE

## 2020-07-01 PROCEDURE — 99213 OFFICE O/P EST LOW 20 MIN: CPT | Performed by: INTERNAL MEDICINE

## 2020-07-01 PROCEDURE — 3017F COLORECTAL CA SCREEN DOC REV: CPT | Performed by: INTERNAL MEDICINE

## 2020-07-01 PROCEDURE — G8427 DOCREV CUR MEDS BY ELIG CLIN: HCPCS | Performed by: INTERNAL MEDICINE

## 2020-07-01 RX ORDER — CHOLECALCIFEROL (VITAMIN D3) 125 MCG
1 CAPSULE ORAL DAILY
COMMUNITY
Start: 2020-06-25

## 2020-07-01 RX ORDER — HYDROCODONE BITARTRATE AND ACETAMINOPHEN 5; 300 MG/1; MG/1
1 TABLET ORAL 4 TIMES DAILY
COMMUNITY
Start: 2016-08-12

## 2020-07-01 NOTE — PROGRESS NOTES
[facility]    Follow-up NOTE           Visit date: 7/1/2020      Reason for visit /chief complaints   osteo    Assessment:     Encounter Diagnosis   Name Primary? 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 and osteomyelitis/cellulitis left lower extremity, was planned to be treated till June 10th 2019 based upon the culture data  Previous culture MSSA and Enterobacter along with anaerobic gram-positive cocci January 2019  Status post partial excision of calcaneus/tibia for deep osteomyelitis, left tibia deep implant removal with insertion of antibiotic nail to the left leg 04/30/2019 cultures negative for aerobic organism positive for anaerobic gram-positive cocci        Plan:     External fixator has been removed in May and patient has completed antibiotic doxycycline and Augmentin in May    no signs of acute infection at this point  Continue supportive care  Follow-up with me as needed   Continue to follow podiatrist for right foot wound      HPI:    Patient is 33-year-old female came in for follow-up. I am following her for right lower extremity chronic osteo-as well as pin site infection of external fixator. She had a fixator removed already in May and she has completed antibiotic course with doxycycline and Augmentin   No fever chills. No cough or shortness of breath. No vomiting or diarrhea.           Past Medical History:   Diagnosis Date    Abnormal CBC 2/19/2019    Abnormal complete blood count 2/19/2019    Age-related nuclear cataract, bilateral 3/30/2020    Cellulitis and abscess of toe 4/30/2018    Cellulitis and abscess of toe of left foot 4/30/2018    Charcot's arthropathy 2/14/2019    Chronic fatigue syndrome 11/1/2017    Chronic pain in left foot 12/6/2018    Chronic pain of left knee 2/14/2019    Encounter for long-term opiate analgesic use 12/6/2018    Encounter for screening mammogram for malignant neoplasm of breast 2/19/2019    Essential hypertension 2/19/2019    Foot-drop 11/1/2017    Gastroesophageal reflux disease 2/19/2019    Hair loss 2/19/2019    Hypertension 5/13/2020    Iron deficiency 2/19/2019    Knee pain 2/14/2019    Loss of hair 2/19/2019    Myopia, bilateral 3/30/2020    Osteomyelitis of left ankle ([facility] [address] 4/11/2019    Peripherally inserted central venous catheter in situ 4/12/2019    PICC (peripherally inserted central catheter) in place 4/12/2019    Restless legs 9/12/2017    Restless legs syndrome (RLS) 9/12/2017    Type 2 diabetes mellitus without complication ([address] 5/81/2012    Type 2 diabetes mellitus without complication, without long-term current use of insulin ([address] 9/12/2017     No past surgical history on file.   Social History     Socioeconomic History    Marital status:      Spouse name: None    Number of children: None    Years of education: None    Highest education level: None   Occupational History    None   Social Needs    Financial resource strain: None    Food insecurity     Worry: None     Inability: None    Transportation needs     Medical: None     Non-medical: None   Tobacco Use    Smoking status: Never Smoker    Smokeless tobacco: Never Used   Substance and Sexual Activity    Alcohol use: None    Drug use: None    Sexual activity: None   Lifestyle    Physical activity     Days per week: None     Minutes per session: None    Stress: None   Relationships    Social connections     Talks on phone: None     Gets together: None     Attends Muslim service: None     Active member of club or organization: None     Attends meetings of clubs or organizations: None     Relationship status: None    Intimate partner violence     Fear of current or ex partner: None     Emotionally abused: None     Physically abused: None     Forced sexual activity: None   Other Topics Concern    None   Social History Narrative    None     No family history on file. Current med     Current Outpatient Medications:     HYDROcodone-acetaminophen (VICODIN) 5-300 MG TABS, Take 1 tablet by mouth 4 times daily. , Disp: , Rfl:     D3 HIGH POTENCY 50 MCG (2000 UT) CAPS, Take 1 capsule by mouth daily, Disp: , Rfl:     Calcium Citrate 200 MG TABS, Take 2 tablets by mouth 3 times daily, Disp: , Rfl:     diazePAM (VALIUM) 5 MG tablet, , Disp: , Rfl:     sulfamethoxazole-trimethoprim (BACTRIM DS) 800-160 MG per tablet, Take 1 tablet by mouth 2 times daily, Disp: , Rfl:     ezetimibe (ZETIA) 10 MG tablet, Take 10 mg by mouth daily, Disp: , Rfl:     ibuprofen (ADVIL;MOTRIN) 200 MG tablet, Take 600 mg by mouth daily, Disp: , Rfl:     tiZANidine (ZANAFLEX) 4 MG tablet, Take 4 mg by mouth nightly, Disp: , Rfl:     oxyCODONE-acetaminophen (PERCOCET) 5-325 MG per tablet, Take 1 tablet by mouth every 6 hours as needed. , Disp: , Rfl:     Wound Dressings ([facility] WOUND/BURN DRESSING) PSTE paste, Apply 2 g topically daily, Disp: , Rfl:     amLODIPine (NORVASC) 5 MG tablet, Take 5 mg by mouth nightly , Disp: , Rfl:     Multiple Vitamins-Minerals (MULTIVITAMIN ADULT PO), Take 1 tablet by mouth daily , Disp: , Rfl:     DULoxetine (CYMBALTA) 30 MG extended release capsule, Take 30 mg by mouth daily , Disp: , Rfl:     gabapentin (NEURONTIN) 800 MG tablet, Take 800 mg by mouth 3 times daily.  , Disp: , Rfl:     hydrochlorothiazide (MICROZIDE) 12.5 MG capsule, Take 12.5 mg by mouth daily , Disp: , Rfl:     ferrous sulfate 325 (65 Fe) MG tablet, Take 325 mg by mouth 2 times daily , Disp: , Rfl:     levothyroxine (SYNTHROID) 100 MCG tablet, Take 100 mcg by mouth Daily , Disp: , Rfl:     losartan (COZAAR) 100 MG tablet, Take 100 mg by mouth daily , Disp: , Rfl:     metFORMIN (GLUCOPHAGE) 500 MG tablet, Take 500 mg by mouth daily (with breakfast) , Disp: , Rfl:     metoprolol tartrate (LOPRESSOR) 50 MG tablet, Take 75 mg by mouth 2 times daily , Disp: , Rfl:     omeprazole (PRILOSEC) 40 MG delayed release capsule, Take 40 mg by mouth daily , Disp: , Rfl:     rOPINIRole (REQUIP) 1 MG tablet, Take 2 mg by mouth nightly 1-2 tablets at bedtime, Disp: , Rfl:     Vitamin E 400 units TABS, Take 800 Units by mouth every morning , Disp: , Rfl:     acetaminophen (TYLENOL) 500 MG tablet, Take 500 mg by mouth every 6 hours as needed , Disp: , Rfl:     vitamin B-6 (PYRIDOXINE) 100 MG tablet, Take 100 mg by mouth daily , Disp: , Rfl:      Review of Systems:  CONSTITUTIONAL:  negative  EYES:  negative  HEENT:  negative  RESPIRATORY:  negative  CARDIOVASCULAR:  negative  GASTROINTESTINAL:  negative  GENITOURINARY:  negative  INTEGUMENT/BREAST:  negative  HEMATOLOGIC/LYMPHATIC:  negative  ALLERGIC/IMMUNOLOGIC:  negative  ENDOCRINE:  negative  MUSCULOSKELETAL:  negative  NEUROLOGICAL:  negative  BEHAVIOR/PSYCH:  negative    /74 (Site: Left Lower Arm, Position: Sitting, Cuff Size: Large Adult)   Pulse 59   Temp 97.7 °F (36.5 °C) (Temporal)   Ht 6' 2\" (1.88 m)   Wt (!) 350 lb (158.8 kg)   SpO2 98%   BMI 44.94 kg/m²       EXAM:  CONSTITUTIONAL:  awake, alert, cooperative, no apparent distress,  EYES: conjunctiva normal  ENT:  Normocephalic, without obvious abnormality, atraumatic,  LUNGS:  No increased work of breathing, good air exchange, clear to auscultation bilaterally, no crackles or wheezing  CARDIOVASCULAR:  regular rate and rhythm, normal S1 and S2,  no murmur noted  ABDOMEN:   normal bowel sounds, soft, non-distended, non-tender, no masses palpated, no hepatosplenomegally,   MUSCULOSKELETAL:  , left lower extremity site is healing NEUROLOGIC:  Awake, alert, oriented to name, place and time  SKIN:  No rash      Data Review:    Reviewed   IMAGING:          Sherryle Pal, MD  7/1/2020  10:43 AM      This note was completed using a voice transcription system. Every effort was made to ensure accuracy. However, inadvertent computerized transcription errors may be present.

## 2020-11-03 PROBLEM — I10 ESSENTIAL HYPERTENSION: Status: RESOLVED | Noted: 2019-02-19 | Resolved: 2020-11-03

## 2024-07-29 ENCOUNTER — TELEPHONE (OUTPATIENT)
Dept: PREADMISSION TESTING | Age: 59
End: 2024-07-29

## 2024-07-29 RX ORDER — DULOXETIN HYDROCHLORIDE 60 MG/1
60 CAPSULE, DELAYED RELEASE ORAL NIGHTLY
COMMUNITY

## 2024-07-29 RX ORDER — PANTOPRAZOLE SODIUM 40 MG/1
40 TABLET, DELAYED RELEASE ORAL 2 TIMES DAILY
COMMUNITY

## 2024-07-29 RX ORDER — BUPROPION HYDROCHLORIDE 150 MG/1
150 TABLET ORAL EVERY MORNING
COMMUNITY

## 2024-07-29 RX ORDER — MONTELUKAST SODIUM 10 MG/1
10 TABLET ORAL NIGHTLY
COMMUNITY

## 2024-07-29 RX ORDER — TIZANIDINE 4 MG/1
6 TABLET ORAL NIGHTLY
COMMUNITY

## 2024-07-29 RX ORDER — METOPROLOL SUCCINATE 50 MG/1
75 TABLET, EXTENDED RELEASE ORAL 2 TIMES DAILY
COMMUNITY

## 2024-07-29 RX ORDER — TRAZODONE HYDROCHLORIDE 50 MG/1
50 TABLET ORAL NIGHTLY
COMMUNITY

## 2024-07-29 RX ORDER — LOSARTAN POTASSIUM 50 MG/1
50 TABLET ORAL DAILY
COMMUNITY

## 2024-07-29 RX ORDER — DICYCLOMINE HYDROCHLORIDE 10 MG/1
10 CAPSULE ORAL
COMMUNITY

## 2024-07-29 RX ORDER — GABAPENTIN 400 MG/1
400 CAPSULE ORAL EVERY EVENING
COMMUNITY

## 2024-07-29 NOTE — PROGRESS NOTES
Patient instructed on the pre-operative, intra-operative, and post-operative process. Patient instructed on NPO status. Medication instructions and pre operative instruction sheet reviewed with the patient. Instructed pt to stop taking ibuprofen and all OTC vitamins 7 days prior to surgery and to take wellbutrin, gabapentin, synthroid, metoprolol, protonix, and zetia with a small sip of water prior to arriving to the hospital the day of surgery. Patient states she was instructed to stop eliquis as of today per MD office.

## 2024-07-30 ENCOUNTER — ANESTHESIA EVENT (OUTPATIENT)
Dept: OPERATING ROOM | Age: 59
End: 2024-07-30
Payer: COMMERCIAL

## 2024-07-30 NOTE — TELEPHONE ENCOUNTER
Will need more information on the pt most recent note is from 3 years ago.  Could we please get most recent note from PCP. Thanks

## 2024-07-31 ENCOUNTER — ANESTHESIA (OUTPATIENT)
Dept: OPERATING ROOM | Age: 59
End: 2024-07-31
Payer: COMMERCIAL

## 2024-07-31 ENCOUNTER — HOSPITAL ENCOUNTER (OUTPATIENT)
Age: 59
Setting detail: OUTPATIENT SURGERY
Discharge: HOME | End: 2024-07-31
Attending: ORTHOPAEDIC SURGERY | Admitting: ORTHOPAEDIC SURGERY
Payer: COMMERCIAL

## 2024-07-31 VITALS
HEIGHT: 72 IN | SYSTOLIC BLOOD PRESSURE: 151 MMHG | HEART RATE: 78 BPM | BODY MASS INDEX: 39.68 KG/M2 | RESPIRATION RATE: 16 BRPM | OXYGEN SATURATION: 93 % | WEIGHT: 293 LBS | TEMPERATURE: 96.8 F | DIASTOLIC BLOOD PRESSURE: 76 MMHG

## 2024-07-31 PROCEDURE — 6360000002 HC RX W HCPCS: Performed by: NURSE ANESTHETIST, CERTIFIED REGISTERED

## 2024-07-31 PROCEDURE — 3700000001 HC ADD 15 MINUTES (ANESTHESIA): Performed by: ORTHOPAEDIC SURGERY

## 2024-07-31 PROCEDURE — 6360000002 HC RX W HCPCS: Performed by: NURSE PRACTITIONER

## 2024-07-31 PROCEDURE — 7100000000 HC PACU RECOVERY - FIRST 15 MIN: Performed by: ORTHOPAEDIC SURGERY

## 2024-07-31 PROCEDURE — 2500000003 HC RX 250 WO HCPCS

## 2024-07-31 PROCEDURE — 7100000010 HC PHASE II RECOVERY - FIRST 15 MIN: Performed by: ORTHOPAEDIC SURGERY

## 2024-07-31 PROCEDURE — 2580000003 HC RX 258: Performed by: ORTHOPAEDIC SURGERY

## 2024-07-31 PROCEDURE — 2720000010 HC SURG SUPPLY STERILE: Performed by: ORTHOPAEDIC SURGERY

## 2024-07-31 PROCEDURE — 7100000011 HC PHASE II RECOVERY - ADDTL 15 MIN: Performed by: ORTHOPAEDIC SURGERY

## 2024-07-31 PROCEDURE — 3700000000 HC ANESTHESIA ATTENDED CARE: Performed by: ORTHOPAEDIC SURGERY

## 2024-07-31 PROCEDURE — 6360000002 HC RX W HCPCS

## 2024-07-31 PROCEDURE — 3600000014 HC SURGERY LEVEL 4 ADDTL 15MIN: Performed by: ORTHOPAEDIC SURGERY

## 2024-07-31 PROCEDURE — 7100000001 HC PACU RECOVERY - ADDTL 15 MIN: Performed by: ORTHOPAEDIC SURGERY

## 2024-07-31 PROCEDURE — 3600000004 HC SURGERY LEVEL 4 BASE: Performed by: ORTHOPAEDIC SURGERY

## 2024-07-31 PROCEDURE — 2709999900 HC NON-CHARGEABLE SUPPLY: Performed by: ORTHOPAEDIC SURGERY

## 2024-07-31 PROCEDURE — 6370000000 HC RX 637 (ALT 250 FOR IP): Performed by: NURSE ANESTHETIST, CERTIFIED REGISTERED

## 2024-07-31 PROCEDURE — 64447 NJX AA&/STRD FEMORAL NRV IMG: CPT

## 2024-07-31 PROCEDURE — 6370000000 HC RX 637 (ALT 250 FOR IP): Performed by: NURSE PRACTITIONER

## 2024-07-31 PROCEDURE — 2580000003 HC RX 258: Performed by: NURSE ANESTHETIST, CERTIFIED REGISTERED

## 2024-07-31 PROCEDURE — 6360000002 HC RX W HCPCS: Performed by: ORTHOPAEDIC SURGERY

## 2024-07-31 RX ORDER — OXYCODONE HYDROCHLORIDE 5 MG/1
10 TABLET ORAL PRN
Status: DISCONTINUED | OUTPATIENT
Start: 2024-07-31 | End: 2024-07-31 | Stop reason: HOSPADM

## 2024-07-31 RX ORDER — KETOROLAC TROMETHAMINE 30 MG/ML
INJECTION, SOLUTION INTRAMUSCULAR; INTRAVENOUS PRN
Status: DISCONTINUED | OUTPATIENT
Start: 2024-07-31 | End: 2024-07-31 | Stop reason: SDUPTHER

## 2024-07-31 RX ORDER — OXYCODONE HYDROCHLORIDE AND ACETAMINOPHEN 5; 325 MG/1; MG/1
2 TABLET ORAL
Status: COMPLETED | OUTPATIENT
Start: 2024-07-31 | End: 2024-07-31

## 2024-07-31 RX ORDER — DIMENHYDRINATE 50 MG
50 TABLET ORAL
Status: COMPLETED | OUTPATIENT
Start: 2024-07-31 | End: 2024-07-31

## 2024-07-31 RX ORDER — BUPIVACAINE HYDROCHLORIDE 5 MG/ML
INJECTION, SOLUTION EPIDURAL; INTRACAUDAL PRN
Status: DISCONTINUED | OUTPATIENT
Start: 2024-07-31 | End: 2024-07-31 | Stop reason: ALTCHOICE

## 2024-07-31 RX ORDER — DEXAMETHASONE SODIUM PHOSPHATE 10 MG/ML
INJECTION INTRAMUSCULAR; INTRAVENOUS PRN
Status: DISCONTINUED | OUTPATIENT
Start: 2024-07-31 | End: 2024-07-31 | Stop reason: SDUPTHER

## 2024-07-31 RX ORDER — ACETAMINOPHEN 325 MG/1
650 TABLET ORAL ONCE
Status: COMPLETED | OUTPATIENT
Start: 2024-07-31 | End: 2024-07-31

## 2024-07-31 RX ORDER — ROPIVACAINE HYDROCHLORIDE 5 MG/ML
INJECTION, SOLUTION EPIDURAL; INFILTRATION; PERINEURAL PRN
Status: DISCONTINUED | OUTPATIENT
Start: 2024-07-31 | End: 2024-07-31 | Stop reason: SDUPTHER

## 2024-07-31 RX ORDER — PROCHLORPERAZINE EDISYLATE 5 MG/ML
5 INJECTION INTRAMUSCULAR; INTRAVENOUS
Status: DISCONTINUED | OUTPATIENT
Start: 2024-07-31 | End: 2024-07-31 | Stop reason: HOSPADM

## 2024-07-31 RX ORDER — PROPOFOL 10 MG/ML
INJECTION, EMULSION INTRAVENOUS PRN
Status: DISCONTINUED | OUTPATIENT
Start: 2024-07-31 | End: 2024-07-31 | Stop reason: SDUPTHER

## 2024-07-31 RX ORDER — NALOXONE HYDROCHLORIDE 0.4 MG/ML
INJECTION, SOLUTION INTRAMUSCULAR; INTRAVENOUS; SUBCUTANEOUS PRN
Status: DISCONTINUED | OUTPATIENT
Start: 2024-07-31 | End: 2024-07-31 | Stop reason: HOSPADM

## 2024-07-31 RX ORDER — ONDANSETRON 2 MG/ML
4 INJECTION INTRAMUSCULAR; INTRAVENOUS
Status: DISCONTINUED | OUTPATIENT
Start: 2024-07-31 | End: 2024-07-31 | Stop reason: HOSPADM

## 2024-07-31 RX ORDER — SODIUM CHLORIDE, SODIUM LACTATE, POTASSIUM CHLORIDE, CALCIUM CHLORIDE 600; 310; 30; 20 MG/100ML; MG/100ML; MG/100ML; MG/100ML
INJECTION, SOLUTION INTRAVENOUS CONTINUOUS
Status: DISCONTINUED | OUTPATIENT
Start: 2024-07-31 | End: 2024-07-31 | Stop reason: HOSPADM

## 2024-07-31 RX ORDER — FENTANYL CITRATE 50 UG/ML
50 INJECTION, SOLUTION INTRAMUSCULAR; INTRAVENOUS EVERY 5 MIN PRN
Status: COMPLETED | OUTPATIENT
Start: 2024-07-31 | End: 2024-07-31

## 2024-07-31 RX ORDER — ACETAMINOPHEN 325 MG/1
650 TABLET ORAL ONCE
Status: DISCONTINUED | OUTPATIENT
Start: 2024-07-31 | End: 2024-07-31 | Stop reason: HOSPADM

## 2024-07-31 RX ORDER — DEXMEDETOMIDINE HYDROCHLORIDE 100 UG/ML
INJECTION, SOLUTION INTRAVENOUS PRN
Status: DISCONTINUED | OUTPATIENT
Start: 2024-07-31 | End: 2024-07-31 | Stop reason: SDUPTHER

## 2024-07-31 RX ORDER — ONDANSETRON 2 MG/ML
INJECTION INTRAMUSCULAR; INTRAVENOUS PRN
Status: DISCONTINUED | OUTPATIENT
Start: 2024-07-31 | End: 2024-07-31 | Stop reason: SDUPTHER

## 2024-07-31 RX ORDER — LABETALOL HYDROCHLORIDE 5 MG/ML
10 INJECTION, SOLUTION INTRAVENOUS
Status: DISCONTINUED | OUTPATIENT
Start: 2024-07-31 | End: 2024-07-31 | Stop reason: HOSPADM

## 2024-07-31 RX ORDER — OXYCODONE HYDROCHLORIDE 5 MG/1
5 TABLET ORAL PRN
Status: DISCONTINUED | OUTPATIENT
Start: 2024-07-31 | End: 2024-07-31 | Stop reason: HOSPADM

## 2024-07-31 RX ORDER — HYDRALAZINE HYDROCHLORIDE 20 MG/ML
10 INJECTION INTRAMUSCULAR; INTRAVENOUS
Status: DISCONTINUED | OUTPATIENT
Start: 2024-07-31 | End: 2024-07-31 | Stop reason: HOSPADM

## 2024-07-31 RX ORDER — FENTANYL CITRATE 50 UG/ML
25 INJECTION, SOLUTION INTRAMUSCULAR; INTRAVENOUS EVERY 5 MIN PRN
Status: DISCONTINUED | OUTPATIENT
Start: 2024-07-31 | End: 2024-07-31 | Stop reason: HOSPADM

## 2024-07-31 RX ORDER — SODIUM CHLORIDE 9 MG/ML
INJECTION, SOLUTION INTRAVENOUS PRN
Status: DISCONTINUED | OUTPATIENT
Start: 2024-07-31 | End: 2024-07-31 | Stop reason: HOSPADM

## 2024-07-31 RX ORDER — DEXAMETHASONE SODIUM PHOSPHATE 4 MG/ML
INJECTION, SOLUTION INTRA-ARTICULAR; INTRALESIONAL; INTRAMUSCULAR; INTRAVENOUS; SOFT TISSUE PRN
Status: DISCONTINUED | OUTPATIENT
Start: 2024-07-31 | End: 2024-07-31 | Stop reason: SDUPTHER

## 2024-07-31 RX ADMIN — ACETAMINOPHEN 650 MG: 325 TABLET ORAL at 13:13

## 2024-07-31 RX ADMIN — OXYCODONE HYDROCHLORIDE AND ACETAMINOPHEN 2 TABLET: 5; 325 TABLET ORAL at 17:57

## 2024-07-31 RX ADMIN — PROPOFOL 200 MG: 10 INJECTION, EMULSION INTRAVENOUS at 14:31

## 2024-07-31 RX ADMIN — FENTANYL CITRATE 50 MCG: 50 INJECTION, SOLUTION INTRAMUSCULAR; INTRAVENOUS at 16:25

## 2024-07-31 RX ADMIN — Medication 3000 MG: at 14:24

## 2024-07-31 RX ADMIN — DEXAMETHASONE SODIUM PHOSPHATE 4 MG: 4 INJECTION INTRA-ARTICULAR; INTRALESIONAL; INTRAMUSCULAR; INTRAVENOUS; SOFT TISSUE at 14:37

## 2024-07-31 RX ADMIN — DIMENHYDRINATE 50 MG: 50 TABLET ORAL at 13:13

## 2024-07-31 RX ADMIN — ROPIVACAINE HYDROCHLORIDE 15 ML: 5 INJECTION, SOLUTION EPIDURAL; INFILTRATION; PERINEURAL at 16:56

## 2024-07-31 RX ADMIN — ONDANSETRON 4 MG: 2 INJECTION INTRAMUSCULAR; INTRAVENOUS at 15:44

## 2024-07-31 RX ADMIN — PROPOFOL 70 MG: 10 INJECTION, EMULSION INTRAVENOUS at 14:51

## 2024-07-31 RX ADMIN — PROPOFOL 40 MG: 10 INJECTION, EMULSION INTRAVENOUS at 15:41

## 2024-07-31 RX ADMIN — SODIUM CHLORIDE, POTASSIUM CHLORIDE, SODIUM LACTATE AND CALCIUM CHLORIDE: 600; 310; 30; 20 INJECTION, SOLUTION INTRAVENOUS at 13:28

## 2024-07-31 RX ADMIN — DEXMEDETOMIDINE HCL 20 MCG: 100 INJECTION INTRAVENOUS at 14:40

## 2024-07-31 RX ADMIN — FENTANYL CITRATE 50 MCG: 50 INJECTION, SOLUTION INTRAMUSCULAR; INTRAVENOUS at 16:19

## 2024-07-31 RX ADMIN — DEXAMETHASONE SODIUM PHOSPHATE 10 MG: 10 INJECTION INTRAMUSCULAR; INTRAVENOUS at 16:56

## 2024-07-31 RX ADMIN — KETOROLAC TROMETHAMINE 30 MG: 30 INJECTION, SOLUTION INTRAMUSCULAR at 15:44

## 2024-07-31 ASSESSMENT — PAIN DESCRIPTION - DESCRIPTORS
DESCRIPTORS: SHARP
DESCRIPTORS: SORE
DESCRIPTORS: SHARP
DESCRIPTORS: SHARP
DESCRIPTORS: SORE;PRESSURE
DESCRIPTORS: SORE
DESCRIPTORS: SHARP

## 2024-07-31 ASSESSMENT — PAIN - FUNCTIONAL ASSESSMENT
PAIN_FUNCTIONAL_ASSESSMENT: 0-10
PAIN_FUNCTIONAL_ASSESSMENT: FACE, LEGS, ACTIVITY, CRY, AND CONSOLABILITY (FLACC)
PAIN_FUNCTIONAL_ASSESSMENT: 0-10
PAIN_FUNCTIONAL_ASSESSMENT: 0-10
PAIN_FUNCTIONAL_ASSESSMENT: FACE, LEGS, ACTIVITY, CRY, AND CONSOLABILITY (FLACC)
PAIN_FUNCTIONAL_ASSESSMENT: 0-10
PAIN_FUNCTIONAL_ASSESSMENT: FACE, LEGS, ACTIVITY, CRY, AND CONSOLABILITY (FLACC)
PAIN_FUNCTIONAL_ASSESSMENT: 0-10

## 2024-07-31 ASSESSMENT — PAIN DESCRIPTION - ORIENTATION: ORIENTATION: LEFT

## 2024-07-31 ASSESSMENT — PAIN DESCRIPTION - LOCATION: LOCATION: KNEE

## 2024-07-31 ASSESSMENT — PAIN SCALES - GENERAL: PAINLEVEL_OUTOF10: 6

## 2024-07-31 NOTE — ANESTHESIA PROCEDURE NOTES
Peripheral Block    Patient location during procedure: pre-op  Reason for block: post-op pain management  Start time: 7/31/2024 4:45 PM  End time: 7/31/2024 4:58 PM  Staffing  Resident/CRNA: Pura Peralta APRN - CRNA  Other anesthesia staff: Fco Shelley APRN - CRNA  Performed by: Pura Peralta APRN - CRNA  Authorized by: Pura Peralta APRN - CRNA    Preanesthetic Checklist  Completed: patient identified, IV checked, site marked, risks and benefits discussed, surgical/procedural consents, equipment checked, pre-op evaluation, timeout performed, anesthesia consent given, oxygen available, monitors applied/VS acknowledged, fire risk safety assessment completed and verbalized and blood product R/B/A discussed and consented  Peripheral Block   Patient position: supine  Prep: ChloraPrep  Provider prep: mask and sterile gloves  Patient monitoring: continuous pulse ox and IV access  Block type: Saphenous  Laterality: left  Injection technique: single-shot  Guidance: ultrasound guided    Needle   Needle type: Other   Needle gauge: 22 G  Needle localization: ultrasound guidance  Needle length: 10 cmOther needle type: [place]  Assessment   Injection assessment: negative aspiration for heme, no paresthesia on injection, local visualized surrounding nerve on ultrasound, no intravascular symptoms and low pressure verified by pressure monitor  Paresthesia pain: none  Slow fractionated injection: yes  Hemodynamics: stable  Outcomes: uncomplicated and patient tolerated procedure well    Additional Notes  Done in post-op for uncontrolled knee pain.  Administered:  15cc 0.5% ropivcaine PF  10cc NS PF  10mg Decadron PF

## 2024-07-31 NOTE — OP NOTE
DATE OF VISIT: 7/31/2020    PREOPERATIVE DIAGNOSIS:  Left knee MR confirmed meniscal tear,  chondromalacia, internal derangement,Body mass index is 44.55 kg/m².    POSTOPERATIVE DIAGNOSIS:  Left knee MR confirmed meniscal tear,  chondromalacia, internal derangement, Body mass index is 44.55 kg/m².      OPERATION PERFORMED:  Left knee arthroscopy with partial medial and lateral  meniscectomy with patellofemoral chondroplasty and chondral drilling of grade 4 change    SURGEON: Uriel Ford MD    ANESTHESIOLOGIST: BETZAIDA: Pura Peralta, MOHAMUD - BETZAIDA; Petra Medina APRN - BETZAIDA    ASSISTANT: Kristy    ESTIMATED BLOOD LOSS: Less than 10 cc    SPECIMENS: None.       INDICATIONS: The patient is a 58 y.o. female with pain and discomfort of  left knee. Patient has had persistent pain and disablility despite nonoperative management. MRI confirmed meniscal tear, chondromalacia, internal derangement. Given the persistent pain and disability, wished to proceed with surgical treatment. I reviewed the risks, complications, alternatives and benefits. Patient was informed that symptomatic improvement is more common than complete resolution of symptoms, given the underlying chondromalacia change. The patient's family's questions answered, informed consent signed.     DESCRIPTION OF PROCEDURE: A [facility] in the  Operative Suite, time-out was taken. The patient's operative site  verified, administered anesthetic, and preoperative  antibiotics, carefully padded, and positioned on the operative  table.  Left lower extremity was raised, gauze, padding,and  tourniquet placed, secured to the leg tyson. The leg was prepped and draped in  routine sterile fashion.  Total time for the procedure was  less than one hour. We proceeded to make an inferolateral  inferomedial portals used for introduction of arthroscope and  instrumentation. Visualization of the medial compartment revealed a complex tear  of the posterior and  from the chondral PICC site.  Portal sites were closed using nylon suture and the port sites were injected with ropivacaine solution.  A sterile dressing was applied. The patient was then carefully transferred from operating table to [facility] and taken to recovery room in stable condition.    Signed not reviewed in detail    This note was made through a speech recognition program and may contain errors due to phonetic interpretation.

## 2024-07-31 NOTE — PROGRESS NOTES
Pura HUSTON notified of pt's continued elevated pain levels despite pain medication given.  Waiting to hear back about rescue block or to give more pain medication.

## 2024-07-31 NOTE — PROGRESS NOTES
Petra CRNA and Fco CRNA notified of pt's continued fluctuating O2 sats and \"chest tightness.\"  Pt to try incentive spirometry.

## 2024-07-31 NOTE — DISCHARGE INSTRUCTIONS
Patient is permitted weightbearing as tolerated on the left lower extremity.  She is to follow-up with physical therapy on Friday as ordered.  She is to continue exercise fall prevention with use of her wheelchair as well as her walker.    Last dose of Percocet given at 6:00 PM, next dose ok to take at 12:00 AM (midnight).    SAME DAY SURGERY DISCHARGE INSTRUCTIONS    1.  Do not drive or operate hazardous machinery for 24 hours.    2.  Do not make important personal or business decisions for 24 hours.    3.  Do not drink alcoholic beverages for 24 hours.    4.  Do not smoke tobacco products for 24 hours.    5.  Eat light foods (Jell-O, soups, etc....) and drink plenty of fluids (water, Sprite, etc...) up to 8 glasses per day, as you can tolerate.    6.  If your bandages become soaked with bright red blood, place another dressing pad over your bandages.  (DO NOT remove original bandage.)  Call your surgeon for further instructions.  A small amount of bright red blood is to be expected.    7.  Limit your activities for 24 hours.  Do not engage in heavy work until your surgeon gives you permission.      8.  Patient should not be left alone for 12-24 hours following surgical procedure.    9.  Wash hands before and after incision care.  It is important to practice good personal hygiene during the post op period.    10. Report the following signs or any questions regarding your physical condition to your surgeon immediately:    Excessive swelling of, or around the wound area.    Redness.    Temperature of 100 degrees (F) or above.    Excessive pain.    11.  Call your surgeon [phone] for any questions regarding your surgery.      SPECIAL INSTRUCTIONS AND MEDICATIONS    1.  Elevate leg on pillow for comfort & ice to operative site.    2.  Move toes to improve circulation.    3.  Continue leg exercises as instructed by Physical Therapy.    4.  Use prescribed pain pill as directed by the doctor.  You may use aspirin or  Tylenol if you prefer.    5.  Keep your dressing on and dry.    6.  Re-wrap with elastic wrap if knee is swollen.    7.  Use crutches only as needed.

## 2024-07-31 NOTE — PROGRESS NOTES
Discharge Criteria    Inpatients must meet Criteria 1 through 7. All other patients are either YES or N/A. If a NO is chosen then Anesthesia or Surgeon must be notified.      1.  Minimum 30 minutes after last dose of sedative medication.    Yes      2.  Systolic BP between 90 - 160. Diastolic BP between 60 - 90.    Yes      3.  Pulse between 60 - 120    Yes      4.  Respirations between 8 - 25.    Yes      5.  SpO2 92% - 100%.    Yes      6.  Able to cough and swallow or return to baseline function.    Yes      7.  Alert and oriented or return to baseline mental status.    Yes      8.  Demonstrates controlled, coordinated movements, ambulates with steady gait, or return to baseline activity function.    Yes      9.  Minimal or no pain or nausea, or at a level tolerable and acceptable to patient.    Yes      10. Takes and retains oral fluids as allowed.    Yes      11. Procedural / perioperative site stable.  Minimal or no bleeding.    Yes          12. If GI endoscopy procedure, minimal or no abdominal distention or passing flatus.    N/a      13. Written discharge instructions and emergency telephone number provided.    Yes      14. Accompanied by a responsible adult.    Yes

## 2024-07-31 NOTE — PROGRESS NOTES
Petra notified of pt's elevated BP and still high pain rating.  Suggestion to ask pt if she is willing to get up to a chair and eat something so we can give her pain pills.  Pt agreeable.

## 2024-07-31 NOTE — PROGRESS NOTES
Pt asking if she can take some deep breaths and states her chest \"feels tight.\"  O2 placed back on per pt request despite O2 sats 97-98% on RA.

## 2024-07-31 NOTE — PROGRESS NOTES
Fco HUSTON and Pura HUSTON at bedside for peripheral nerve block per pt request.  Fco HUSTON notified of pt's \"tight chest\" feeling.  Will continue to monitor.

## 2024-07-31 NOTE — PROGRESS NOTES
Surgical Care Improvement Project Dose Adjustment    The following antibiotic dose was adjusted by the pharmacist to meet SCIP guidelines.  Dose adjustments made as approved by the Surgical Care Review Committee.    Antibiotic Adjusted: Ancef adjusted to 3 gram for weight > 120 kg  Valeria Ayala Rph., 7/31/2024, 12:25 PM

## 2024-07-31 NOTE — ANESTHESIA PRE PROCEDURE
Department of Anesthesiology  Preprocedure Note       Name:  Mansi Garcia   Age:  58 y.o.  :  1965                                          MRN:  [id number]         Date:  2024      Surgeon: Surgeon(s):  Uriel Ford MD    Procedure: Procedure(s):  KNEE ARTHROSCOPY-WITH MEDIAL OR LATERAL MENISCECTOMY, DEBRIDEMENT/CHONDROPLASTY    Medications prior to admission:   Prior to Admission medications    Medication Sig Start Date End Date Taking? Authorizing Provider   metoprolol succinate (TOPROL XL) 50 MG extended release tablet Take 1.5 tablets by mouth in the morning and 1.5 tablets in the evening.   Yes ProviderSreekanth MD   pantoprazole (PROTONIX) 40 MG tablet Take 1 tablet by mouth in the morning and 1 tablet in the evening.   Yes Sreekanth Chen MD   losartan (COZAAR) 50 MG tablet Take 1 tablet by mouth daily   Yes Sreekanth Chen MD   buPROPion (WELLBUTRIN XL) 150 MG extended release tablet Take 1 tablet by mouth every morning   Yes Sreekanth Chen MD   dicyclomine (BENTYL) 10 MG capsule Take 1 capsule by mouth 4 times daily (before meals and nightly)   Yes ProviderSreekanth MD   DULoxetine (CYMBALTA) 60 MG extended release capsule Take 1 capsule by mouth nightly   Yes ProviderSreekanth MD   apixaban (ELIQUIS) 5 MG TABS tablet Take 1 tablet by mouth 2 times daily   Yes ProviderSreekanth MD   gabapentin (NEURONTIN) 400 MG capsule Take 1 capsule by mouth every evening.   Yes Sreekanth Chen MD   empagliflozin (JARDIANCE) 10 MG tablet Take 1 tablet by mouth daily   Yes ProviderSreekanth MD   montelukast (SINGULAIR) 10 MG tablet Take 1 tablet by mouth nightly   Yes Sreekanth Chen MD   tiZANidine (ZANAFLEX) 4 MG tablet Take 1.5 tablets by mouth nightly   Yes Sreekanth Chen MD   traZODone (DESYREL) 50 MG tablet Take 1 tablet by mouth nightly   Yes ProviderSreekanth MD   D3 HIGH POTENCY 50 MCG (2000 UT) CAPS Take 1 capsule by mouth daily

## 2024-08-01 NOTE — ANESTHESIA POSTPROCEDURE EVALUATION
Department of Anesthesiology  Postprocedure Note    Patient: Mansi Garcia  MRN: [id number]  YOB: 1965  Date of evaluation: 7/31/2024    Procedure Summary       Date: 07/31/24 Room / Location: [address]    Anesthesia Start: 1427 Anesthesia Stop: 1605    Procedure: KNEE ARTHROSCOPY-WITH MEDIAL AND LATERAL MENISCECTOMY, DEBRIDEMENT/CHONDROPLASTY (Left) Diagnosis:       Plica syndrome of left knee      (Plica syndrome of left knee [M67.52])    Surgeons: Uriel Ford MD Responsible Provider: Pura Peralta APRN - CRNA    Anesthesia Type: General ASA Status: 3            Anesthesia Type: General    Herb Phase I: Herb Score: 8    Herb Phase II: Herb Score: 10    Anesthesia Post Evaluation    Patient location during evaluation: bedside  Patient participation: complete - patient participated  Level of consciousness: awake and alert  Airway patency: patent  Nausea & Vomiting: no nausea and no vomiting  Cardiovascular status: hemodynamically stable  Respiratory status: acceptable  Hydration status: stable  Pain management: adequate    No notable events documented.

## (undated) DEVICE — PAD,ABDOMINAL,8"X10",ST,LF: Brand: MEDLINE

## (undated) DEVICE — SOLUTION IRRIG 3000ML 0.9% SOD CHL USP UROMATIC PLAS CONT

## (undated) DEVICE — PADDING CAST W6INXL4YD COT LO LINTING WYTEX

## (undated) DEVICE — SOLUTION IRRIG 1000ML 0.9% SOD CHL USP POUR PLAS BTL

## (undated) DEVICE — HYPODERMIC SAFETY NEEDLE: Brand: MAGELLAN

## (undated) DEVICE — BANDAGE COMPR M W6INXL10YD WHT BGE VELC E MTRX HK AND LOOP

## (undated) DEVICE — 4-PORT MANIFOLD: Brand: NEPTUNE 2

## (undated) DEVICE — SUTURE ETHILON SZ 3-0 L18IN NONABSORBABLE BLK L24MM PS-1 3/8 1663G

## (undated) DEVICE — [TOMCAT CUTTER, ARTHROSCOPIC SHAVER BLADE,  DO NOT RESTERILIZE,  DO NOT USE IF PACKAGE IS DAMAGED,  KEEP DRY,  KEEP AWAY FROM SUNLIGHT]: Brand: FORMULA

## (undated) DEVICE — SPONGE LAP W18XL18IN WHT COT 4 PLY FLD STRUNG RADPQ DISP ST 2 PER PACK

## (undated) DEVICE — NEEDLE SPNL L3.5IN PNK HUB S STL REG WALL FIT STYL W/ QNCKE

## (undated) DEVICE — TUBING PMP L16FT MAIN DISP FOR AR-6400 AR-6475 Â€“ ORDER MULTIPLES OF 10 EACH

## (undated) DEVICE — DISCONTINUED NO SUB IDED TG GLOVE SURG SENSICARE ALOE LT LF PF ST GRN SZ 8

## (undated) DEVICE — PROBE ABLAT XL 90DEG ASPIR BPLR RF 1 PC ELECTRD ERGO HNDL

## (undated) DEVICE — PACK,ARTHROSCOPY I,SIRUS: Brand: MEDLINE

## (undated) DEVICE — GLOVE SURG SZ 8 L12IN FNGR THK87MIL WHT LTX FREE

## (undated) DEVICE — BANDAGE COMPR W6INXL12FT SMOOTH FOR LIMB EXSANG ESMARCH

## (undated) DEVICE — 35 ML SYRINGE LUER-LOCK TIP: Brand: MONOJECT

## (undated) DEVICE — 60-7075-107 TRNQT,SPSB,PLC PURPLE: Brand: MEDLINE RENEWAL

## (undated) DEVICE — [STANDARD 12-FLUTE BARREL BUR, ARTHROSCOPIC SHAVER BLADE,  DO NOT RESTERILIZE,  DO NOT USE IF PACKAGE IS DAMAGED,  KEEP DRY,  KEEP AWAY FROM SUNLIGHT]: Brand: FORMULA

## (undated) DEVICE — APPLICATOR MEDICATED 26 CC SOLUTION HI LT ORNG CHLORAPREP

## (undated) DEVICE — TUBING, SUCTION, 9/32" X 12', STRAIGHT: Brand: MEDLINE INDUSTRIES, INC.

## (undated) DEVICE — SPONGE GZ W4XL4IN COT 12 PLY TYP VII WVN C FLD DSGN STERILE

## (undated) DEVICE — BANDAGE COBAN 6 IN WND 6INX5YD FOAM